# Patient Record
Sex: MALE | Race: WHITE | Employment: FULL TIME | URBAN - METROPOLITAN AREA
[De-identification: names, ages, dates, MRNs, and addresses within clinical notes are randomized per-mention and may not be internally consistent; named-entity substitution may affect disease eponyms.]

---

## 2018-08-13 ENCOUNTER — RADIANT APPOINTMENT (OUTPATIENT)
Dept: MRI IMAGING | Facility: CLINIC | Age: 48
End: 2018-08-13
Attending: PHYSICIAN ASSISTANT

## 2018-08-13 DIAGNOSIS — M54.2 CERVICALGIA: ICD-10-CM

## 2018-08-13 PROCEDURE — 72141 MRI NECK SPINE W/O DYE: CPT | Performed by: RADIOLOGY

## 2021-08-18 ENCOUNTER — OFFICE VISIT (OUTPATIENT)
Dept: URGENT CARE | Facility: URGENT CARE | Age: 51
End: 2021-08-18
Payer: COMMERCIAL

## 2021-08-18 VITALS
OXYGEN SATURATION: 98 % | TEMPERATURE: 98.2 F | RESPIRATION RATE: 18 BRPM | DIASTOLIC BLOOD PRESSURE: 93 MMHG | HEART RATE: 85 BPM | WEIGHT: 235 LBS | SYSTOLIC BLOOD PRESSURE: 138 MMHG

## 2021-08-18 DIAGNOSIS — S60.459A FOREIGN BODY OF FINGER OF RIGHT HAND, INITIAL ENCOUNTER: Primary | ICD-10-CM

## 2021-08-18 PROCEDURE — 99203 OFFICE O/P NEW LOW 30 MIN: CPT | Performed by: PHYSICIAN ASSISTANT

## 2021-08-18 RX ORDER — TRAZODONE HYDROCHLORIDE 50 MG/1
50-100 TABLET, FILM COATED ORAL PRN
COMMUNITY

## 2021-08-18 RX ORDER — ASPIRIN 81 MG/1
81 TABLET, CHEWABLE ORAL DAILY
COMMUNITY

## 2021-08-18 RX ORDER — ALBUTEROL SULFATE 0.83 MG/ML
3 SOLUTION RESPIRATORY (INHALATION) PRN
COMMUNITY

## 2021-08-18 RX ORDER — BACLOFEN 10 MG/1
2 TABLET ORAL DAILY
COMMUNITY
Start: 2021-08-02

## 2021-08-18 RX ORDER — CITALOPRAM HYDROBROMIDE 10 MG/1
10 TABLET ORAL DAILY
COMMUNITY

## 2021-08-18 RX ORDER — BUPRENORPHINE HYDROCHLORIDE 75 UG/1
FILM, SOLUBLE BUCCAL
COMMUNITY
Start: 2021-02-03

## 2021-08-18 RX ORDER — HYDROXYZINE HYDROCHLORIDE 25 MG/1
TABLET, FILM COATED ORAL PRN
COMMUNITY
Start: 2021-03-10

## 2021-08-18 RX ORDER — SENNOSIDES 8.6 MG
1300 CAPSULE ORAL PRN
COMMUNITY

## 2021-08-18 ASSESSMENT — ENCOUNTER SYMPTOMS
SORE THROAT: 0
CHILLS: 0
WOUND: 1
GASTROINTESTINAL NEGATIVE: 1
FREQUENCY: 0
NEUROLOGICAL NEGATIVE: 1
ABDOMINAL PAIN: 0
FEVER: 0
WHEEZING: 0
MUSCULOSKELETAL NEGATIVE: 1
CONSTITUTIONAL NEGATIVE: 1
SHORTNESS OF BREATH: 0
NAUSEA: 0
RESPIRATORY NEGATIVE: 1
MYALGIAS: 0
DIARRHEA: 0
CARDIOVASCULAR NEGATIVE: 1
COUGH: 0
DYSURIA: 0
ALLERGIC/IMMUNOLOGIC NEGATIVE: 1
CHEST TIGHTNESS: 0
HEADACHES: 0
PALPITATIONS: 0
HEMATURIA: 0
VOMITING: 0

## 2021-08-19 NOTE — PROGRESS NOTES
Chief Complaint:    Chief Complaint   Patient presents with     Finger     Splinter in right middle finger, happened on Monday. Hand in painful and swollen.         Medical Decision Making:    Vital signs reviewed by Derrek Saavedra PA-C  BP (!) 138/93 (BP Location: Left arm, Patient Position: Sitting, Cuff Size: Adult Regular)   Pulse 85   Temp 98.2  F (36.8  C) (Tympanic)   Resp 18   Wt 106.6 kg (235 lb)   SpO2 98%     Differential Diagnosis:  Finger infection, finger foreign body.     ASSESSMENT:     1. Foreign body of finger of right hand, initial encounter         PLAN:     Rx for Augmentin tonight.  No foreign body visible or amenable to removal.  Order placed for him to follow up with ortho hand specialist for further evaluation.  Worrisome symptoms discussed with instructions to go to the ED.  Patient verbalized understanding and agreed with this plan.    Labs:     No results found for any visits on 08/18/21.    Current Meds:    Current Outpatient Medications:      amoxicillin-clavulanate (AUGMENTIN) 875-125 MG tablet, Take 1 tablet by mouth 2 times daily for 7 days, Disp: 14 tablet, Rfl: 0     acetaminophen (TYLENOL) 650 MG CR tablet, Take 1,300 mg by mouth as needed, Disp: , Rfl:      albuterol (PROVENTIL) (2.5 MG/3ML) 0.083% neb solution, Inhale 3 mLs into the lungs as needed, Disp: , Rfl:      aspirin (ASA) 81 MG chewable tablet, Take 81 mg by mouth daily, Disp: , Rfl:      baclofen (LIORESAL) 10 MG tablet, Take 2 tablets by mouth daily, Disp: , Rfl:      BELBUCA 75 MCG FILM buccal film, PLACE 1 FILM INSIDE CHEEK 2 TIMES EVERY DAY, HOLDING IN PLACE FOR 5 SECONDS, Disp: , Rfl:      cholecalciferol (VITAMIN D3) 25 mcg (1000 units) capsule, Take 1,000 Units by mouth daily, Disp: , Rfl:      citalopram (CELEXA) 10 MG tablet, Take 10 mg by mouth daily, Disp: , Rfl:      hydrOXYzine (ATARAX) 25 MG tablet, as needed, Disp: , Rfl:      traZODone (DESYREL) 50 MG tablet, Take  mg by mouth as needed,  "Disp: , Rfl:     Allergies:  Allergies   Allergen Reactions     Hydromorphone Difficulty breathing and Nausea and Vomiting     \"Breathing difficulty and profuse vomitting\"  Severe vomiting       Sulfa Drugs Hives and Rash       SUBJECTIVE    HPI: Parth Whitney is an 50 year old male who presents for evaluation and treatment of R middle finger injury.  Patient thinks he got a sliver in the finger 3 days ago.  He has noticed worsening pain, redness and swelling.        Patient is new to [facility].    ROS:      Review of Systems   Constitutional: Negative.  Negative for chills and fever.   HENT: Negative.  Negative for sore throat.    Respiratory: Negative.  Negative for cough, chest tightness, shortness of breath and wheezing.    Cardiovascular: Negative.  Negative for chest pain and palpitations.   Gastrointestinal: Negative.  Negative for abdominal pain, diarrhea, nausea and vomiting.   Genitourinary: Negative for dysuria, frequency, hematuria and urgency.   Musculoskeletal: Negative.  Negative for myalgias.   Skin: Positive for wound. Negative for rash.   Allergic/Immunologic: Negative.  Negative for immunocompromised state.   Neurological: Negative.  Negative for headaches.        Family History   No family history on file.    Social History  Social History     Socioeconomic History     Marital status:      Spouse name: Not on file     Number of children: Not on file     Years of education: Not on file     Highest education level: Not on file   Occupational History     Not on file   Tobacco Use     Smoking status: Never Smoker     Smokeless tobacco: Never Used   Substance and Sexual Activity     Alcohol use: Not on file     Drug use: Not on file     Sexual activity: Not on file   Other Topics Concern     Not on file   Social History Narrative     Not on file     Social Determinants of Health     Financial Resource Strain:      Difficulty of Paying Living Expenses:    Food Insecurity:      " Worried About Running Out of Food in the Last Year:      Ran Out of Food in the Last Year:    Transportation Needs:      Lack of Transportation (Medical):      Lack of Transportation (Non-Medical):    Physical Activity:      Days of Exercise per Week:      Minutes of Exercise per Session:    Stress:      Feeling of Stress :    Social Connections:      Frequency of Communication with Friends and Family:      Frequency of Social Gatherings with Friends and Family:      Attends [employer] Services:      Active Member of Clubs or Organizations:      Attends Club or Organization Meetings:      Marital Status:    Intimate Partner Violence:      Fear of Current or Ex-Partner:      Emotionally Abused:      Physically Abused:      Sexually Abused:         Surgical History:  No past surgical history on file.     Problem List:  Patient Active Problem List   Diagnosis     Pain in joint, shoulder region     Place of occurrence, industrial places and premises     Overexertion and strenuous and repetitive movements or loads           OBJECTIVE:     Vital signs noted and reviewed by Derrek Saavedra PA-C  BP (!) 138/93 (BP Location: Left arm, Patient Position: Sitting, Cuff Size: Adult Regular)   Pulse 85   Temp 98.2  F (36.8  C) (Tympanic)   Resp 18   Wt 106.6 kg (235 lb)   SpO2 98%      PEFR:    Physical Exam  Vitals and nursing note reviewed.   Constitutional:       General: He is not in acute distress.     Appearance: He is well-developed. He is not ill-appearing, toxic-appearing or diaphoretic.   HENT:      Head: Normocephalic and atraumatic.      Right Ear: Hearing, tympanic membrane, ear canal and external ear normal. Tympanic membrane is not perforated, erythematous, retracted or bulging.      Left Ear: Hearing, tympanic membrane, ear canal and external ear normal. Tympanic membrane is not perforated, erythematous, retracted or bulging.      Nose: Nose normal. No mucosal edema, congestion or rhinorrhea.       Mouth/Throat:      Pharynx: No oropharyngeal exudate or posterior oropharyngeal erythema.      Tonsils: No tonsillar exudate or tonsillar abscesses. 0 on the right. 0 on the left.   Eyes:      Pupils: Pupils are equal, round, and reactive to light.   Cardiovascular:      Rate and Rhythm: Normal rate and regular rhythm.      Heart sounds: Normal heart sounds, S1 normal and S2 normal. Heart sounds not distant. No murmur heard.   No friction rub. No gallop.    Pulmonary:      Effort: Pulmonary effort is normal. No respiratory distress.      Breath sounds: Normal breath sounds. No decreased breath sounds, wheezing, rhonchi or rales.   Abdominal:      General: Bowel sounds are normal. There is no distension.      Palpations: Abdomen is soft.      Tenderness: There is no abdominal tenderness.   Musculoskeletal:      Right hand: Swelling and tenderness present. No deformity, lacerations or bony tenderness. Normal range of motion. Normal strength. Normal sensation. There is no disruption of two-point discrimination. Normal capillary refill.      Cervical back: Normal range of motion and neck supple.      Comments: Swelling and erythema of the R middle finger distal phalanx.     Lymphadenopathy:      Cervical: No cervical adenopathy.   Skin:     General: Skin is warm and dry.      Findings: No rash.   Neurological:      Mental Status: He is alert.      Cranial Nerves: No cranial nerve deficit.   Psychiatric:         Attention and Perception: He is attentive.         Speech: Speech normal.         Behavior: Behavior normal. Behavior is cooperative.         Thought Content: Thought content normal.         Judgment: Judgment normal.             Derrek Saavedra PA-C  8/18/2021, 7:56 PM

## 2021-08-19 NOTE — PATIENT INSTRUCTIONS
Patient Education     Foreign Object Under the Skin (Not Removed)  Very small particles that remain under the skin usually don t cause problems or need further treatment. But sometimes they can cause an infection. Sometimes they work their way to the surface on their own without any problems. If you see this happening, you can remove any particles with tweezers. Be careful not to dig up the skin and make things worse.   You may need to see a surgeon if the object is large and couldn't be removed.. The surgeon can assess the injury and treat it. Sometimes a surgeon uses X-rays or ultrasound to guide them in removing the object.   Home care  Wound care    Keep the wound clean and dry.    If there is a dressing or bandage, change it when it gets wet or dirty. Otherwise, leave it on for the first 24 hours, then change it once a day or as often as you were instructed.    If stitches or staples were used, clean the wound every day:  ? After taking off the dressing, wash the area gently with soap and water.  ? Apply a thin layer of antibiotic ointment to the cut. This will keep the wound clean and make it easier to remove the stitches. If it is oozing a lot, you can put a nonstick dressing over it. Then reapply the bandage or dressing as you were instructed.  ? You can get it wet, just like when you clean it. This means you can shower as usual for the first 24 hours. But don't soak the area in water (no baths or swimming) until the stitches or staples are taken out.    If surgical tape or strips were used, keep the area clean and dry. If it becomes wet, blot it dry with a towel.  Medicine    You can take over-the-counter medicine for pain, unless you were given a different pain medicine to use. Talk with your healthcare provider before using these medicines if you have chronic liver or kidney disease, ever had a stomach ulcer or digestive bleeding, or are taking blood-thinner medicines.    If you were given antibiotics,  take them until they are used up. It's important to finish the antibiotics even if the wound looks better to make sure the infection clears.    Follow-up care  Follow up your healthcare provider, or as advised. Keep in mind the following:     Watch for any signs of infection, such as increasing pain, redness, swelling, or pus drainage. If this happens, don t wait for your scheduled visit. See your healthcare provider sooner.    Stitches or staples are usually taken out within 5 to 14 days. This varies depending on what part of your body they are on, and the type of wound. The healthcare provider will tell you how long they should be left in.    If surgical tape or strips were used, they are usually left on for 7 to 10 days. You can remove them after that unless you were told otherwise. If you try to remove them, and it is too difficult, soaking can help. If the edges of the cut pull apart, then stop removing the tape, and follow up with your healthcare provider.    If X-rays were taken, you will be told if there are new findings that may affect your care.  When to seek medical advice  Call your healthcare provider right away if any of these occur:     Fever of 100.4 F (38 C) or higher, or as directed by your healthcare provider    Increasing pain in the wound    Redness, swelling or pus coming from the wound  [email or web address] last reviewed this educational content on 8/1/2019 2000-2021 [employer]. All rights reserved. This information is not intended as a substitute for professional medical care. Always follow your healthcare professional's instructions.

## 2023-05-29 ENCOUNTER — OFFICE VISIT (OUTPATIENT)
Dept: URGENT CARE | Facility: URGENT CARE | Age: 53
End: 2023-05-29
Payer: COMMERCIAL

## 2023-05-29 VITALS
HEART RATE: 76 BPM | OXYGEN SATURATION: 98 % | BODY MASS INDEX: 33.72 KG/M2 | DIASTOLIC BLOOD PRESSURE: 76 MMHG | TEMPERATURE: 98.4 F | HEIGHT: 72 IN | WEIGHT: 249 LBS | SYSTOLIC BLOOD PRESSURE: 108 MMHG

## 2023-05-29 DIAGNOSIS — M23.203 CHRONIC BUCKET HANDLE TEAR OF MEDIAL MENISCUS OF RIGHT KNEE: ICD-10-CM

## 2023-05-29 DIAGNOSIS — M25.561 CHRONIC PAIN OF RIGHT KNEE: Primary | ICD-10-CM

## 2023-05-29 DIAGNOSIS — G89.29 CHRONIC PAIN OF RIGHT KNEE: Primary | ICD-10-CM

## 2023-05-29 PROCEDURE — 20610 DRAIN/INJ JOINT/BURSA W/O US: CPT | Mod: RT | Performed by: FAMILY MEDICINE

## 2023-05-29 PROCEDURE — 99213 OFFICE O/P EST LOW 20 MIN: CPT | Mod: 25 | Performed by: FAMILY MEDICINE

## 2023-05-29 RX ORDER — TRIAMCINOLONE ACETONIDE 40 MG/ML
40 INJECTION, SUSPENSION INTRA-ARTICULAR; INTRAMUSCULAR ONCE
Status: COMPLETED | OUTPATIENT
Start: 2023-05-29 | End: 2023-05-29

## 2023-05-29 RX ADMIN — TRIAMCINOLONE ACETONIDE 40 MG: 40 INJECTION, SUSPENSION INTRA-ARTICULAR; INTRAMUSCULAR at 12:28

## 2023-05-29 NOTE — PROGRESS NOTES
Assessment & Plan     Chronic pain of right knee    - triamcinolone (KENALOG-40) injection 40 mg  - DRAIN/INJECT LARGE JOINT/BURSA    Chronic bucket handle tear of medial meniscus of right knee    - triamcinolone (KENALOG-40) injection 40 mg  - DRAIN/INJECT LARGE JOINT/BURSA    Patient has a chronic right knee pain, he did have a knee CT scan back in June 2022 which showed medial meniscus tear.  Since then he had cortisone injection, lost control last injection was November of last year.  Patient reports that his cortisone injection has been helping.  Pain has been getting worse pain in the last few days, no swelling.       After discussion of various treatment options, and after reviewing risks and benefits and potential complications. And after signing a consent form, we elected to proceed with a cortisone injection for right knee.  The lateral aspect of right knee was cleaned , Betadine and Alcohol, then injected with 40 mg of Kenalog suspension along with 3 cc of 2% Lidocaine.  Patient tolerated injection well, givne post injection instructions.       BMI:   Estimated body mass index is 33.77 kg/m  as calculated from the following:    Height as of this encounter: 1.829 m (6').    Weight as of this encounter: 112.9 kg (249 lb).   Weight management plan: Discussed healthy diet and exercise guidelines    Work on weight loss  Regular exercise    No follow-ups on file.    Norberto Baltazar MD  [facility]RGENT [facility]ARE [facility]    Rose Marie Alba is a 52 year old, presenting for the following health issues:  Urgent Care and Musculoskeletal Problem (Pt in clinic to have eval for right knee pain.)         View : No data to display.              HPI         Review of Systems   Constitutional, HEENT, cardiovascular, pulmonary, gi and gu systems are negative, except as otherwise noted.      Objective    /76   Pulse 76   Temp 98.4  F (36.9  C) (Temporal)   Ht 1.829 m (6')   Wt 112.9 kg (249 lb)    "SpO2 98%   BMI 33.77 kg/m    Body mass index is 33.77 kg/m .  Physical Exam   GENERAL: healthy, alert and no distress  MS: Right knee exam\"  No effusion, no swelling  Tender at medial side of knee  Full range of motion,  Pain with full extension.,  SKIN: no suspicious lesions or rashes  PSYCH: mentation appears normal, affect normal/bright    Orders Placed This Encounter   Procedures     DRAIN/INJECT LARGE JOINT/BURSA         Norberto Baltazar MD            "

## 2023-11-30 ENCOUNTER — ANCILLARY PROCEDURE (OUTPATIENT)
Dept: GENERAL RADIOLOGY | Facility: CLINIC | Age: 53
End: 2023-11-30
Attending: EMERGENCY MEDICINE
Payer: COMMERCIAL

## 2023-11-30 ENCOUNTER — OFFICE VISIT (OUTPATIENT)
Dept: URGENT CARE | Facility: URGENT CARE | Age: 53
End: 2023-11-30
Payer: COMMERCIAL

## 2023-11-30 VITALS
WEIGHT: 234.4 LBS | HEART RATE: 101 BPM | SYSTOLIC BLOOD PRESSURE: 162 MMHG | BODY MASS INDEX: 31.79 KG/M2 | OXYGEN SATURATION: 98 % | DIASTOLIC BLOOD PRESSURE: 85 MMHG | TEMPERATURE: 97.1 F | RESPIRATION RATE: 16 BRPM

## 2023-11-30 DIAGNOSIS — R03.0 ELEVATED BLOOD PRESSURE READING WITHOUT DIAGNOSIS OF HYPERTENSION: ICD-10-CM

## 2023-11-30 DIAGNOSIS — J40 BRONCHITIS: Primary | ICD-10-CM

## 2023-11-30 DIAGNOSIS — J40 BRONCHITIS: ICD-10-CM

## 2023-11-30 PROCEDURE — 99214 OFFICE O/P EST MOD 30 MIN: CPT | Performed by: EMERGENCY MEDICINE

## 2023-11-30 PROCEDURE — 71046 X-RAY EXAM CHEST 2 VIEWS: CPT | Mod: TC | Performed by: RADIOLOGY

## 2023-11-30 RX ORDER — BENZONATATE 100 MG/1
100 CAPSULE ORAL 3 TIMES DAILY PRN
Qty: 25 CAPSULE | Refills: 0 | Status: SHIPPED | OUTPATIENT
Start: 2023-11-30

## 2023-11-30 RX ORDER — PREDNISONE 20 MG/1
40 TABLET ORAL DAILY
Qty: 10 TABLET | Refills: 0 | Status: SHIPPED | OUTPATIENT
Start: 2023-11-30 | End: 2023-12-05

## 2023-11-30 ASSESSMENT — PAIN SCALES - GENERAL: PAINLEVEL: EXTREME PAIN (8)

## 2023-11-30 NOTE — PROGRESS NOTES
Assessment & Plan     Diagnosis:    ICD-10-CM    1. Bronchitis  J40 XR Chest 2 Views     predniSONE (DELTASONE) 20 MG tablet     benzonatate (TESSALON) 100 MG capsule      2. Elevated blood pressure reading without diagnosis of hypertension  R03.0     reports white coat hypertension. Notes his BP at home is always around 120/80. Follow up with PCP        Medical Decision Making:  Parth Whitney is a 53 year old male who presents for evaluation of cough, chest tightness with wheezing, shoulder pain (acute on chronic).  This is consistent with an upper respiratory tract infection.  There is no signs at this point of serious bacterial infection such as OM, RPA, epiglottitis, PTA, strep pharyngitis, pneumonia, meningitis, bacteremia, serious bacterial infection.      Given wheezing/rhonchi on exam, I did a CXR to eval for pneumonia. This shows no acute infiltrate or effusion on my read.  Radiology notes as per below. There are no signs of complications of bronchitis at this point such as septic shock, bacteremia, empyema, hypoxia, respiratory failure or compromise.  Suspect viral bronchitis, medications for symptomatic management as noted above given no contraindications.    There are no gastrointestinal symptoms at this point and no signs of dehydration.  Close followup with PCP is indicated.  Go to ED for fever > 102 F, protracted vomiting, worsening shortness of breath, chest pain or other concerns.  Patient verbalized understanding and agreement with the plan was discharged in stable condition.      Pj Bermudez PA-C  [facility] URGENT CARE    Subjective     Parth Whitney is a 53 year old male who presents to clinic today for the following health issues:  Chief Complaint   Patient presents with    Cough     Deep cough. Two negative covid test. Sx onset- Saturday     Wheezing    Generalized Body Aches       HPI  Patient reports 5 days of cough, body aches, wheezing, feeling slightly short of  breath with this. He notes he is having a lot of back and shoulder pain whenever he coughs as he has broken vertebrae in his back from an accident, has a fentanyl patch in place.  States his chest does feel like it is burning, but is not having any exertional chest pain he does note that he has been dealing with shortness of breath issues since he had COVID 2 years ago; believes he has long hauler COVID symptoms.  He denies any shortness of breath at rest or with minimal exertion, leg swelling, history of blood clots in the legs or lungs, sore throat, difficulty swallowing or other concerns    Review of Systems    See HPI    Objective      Vitals: BP (!) 162/85 (BP Location: Left arm, Patient Position: Sitting, Cuff Size: Adult Regular)   Pulse 101   Temp 97.1  F (36.2  C) (Tympanic)   Resp 16   Wt 106.3 kg (234 lb 6.4 oz)   SpO2 98%   BMI 31.79 kg/m    Resp: 16    Patient Vitals for the past 24 hrs:   BP Temp Temp src Pulse Resp SpO2 Weight   11/30/23 1416 (!) 162/85 -- -- -- -- -- --   11/30/23 1415 (!) 156/104 97.1  F (36.2  C) Tympanic 101 16 98 % 106.3 kg (234 lb 6.4 oz)       Vital signs reviewed by: Pj Bermudez PA-C    Physical Exam   Constitutional: Patient is alert and cooperative. No acute distress.  Ears: Right TM is normal. Left TM is normal. External ear canals are normal.  Mouth: Mucous membranes are moist. Normal tongue and tonsil. Posterior oropharynx is clear.  Eyes: Conjunctivae, EOMI and lids are normal. PERRL.  Cardiovascular: Regular rate and rhythm  Pulmonary/Chest: Wheezes/rhonchi in the right lung fields.  No crackles.  Effort normal.  No retractions.  Speaking in full sentences, no respiratory distress.  Neurological: Alert and oriented x3.  MSK: No leg swelling bilaterally.   Skin: No rash noted on visualized skin.  Psychiatric:The patient has a normal mood and affect.     Labs/Imaging:  Results for orders placed or performed in visit on 11/30/23   XR Chest 2 Views     Status:  None    Narrative    CHEST TWO VIEWS  11/30/2023 2:46 PM     HISTORY: Bronchitis.    COMPARISON: None.       Impression    IMPRESSION:  There are no acute infiltrates. The cardiac silhouette is  not enlarged. Pulmonary vasculature is unremarkable.     MILLIE JASMINE MD         SYSTEM ID:  [id number]     Reading per radiology        Pj Bermudez PA-C, November 30, 2023

## 2024-05-13 ENCOUNTER — OFFICE VISIT (OUTPATIENT)
Dept: URGENT CARE | Facility: URGENT CARE | Age: 54
End: 2024-05-13
Payer: MEDICARE

## 2024-05-13 VITALS
HEART RATE: 111 BPM | TEMPERATURE: 98.2 F | DIASTOLIC BLOOD PRESSURE: 104 MMHG | SYSTOLIC BLOOD PRESSURE: 177 MMHG | OXYGEN SATURATION: 96 % | WEIGHT: 250 LBS | BODY MASS INDEX: 33.91 KG/M2

## 2024-05-13 DIAGNOSIS — S71.112A LACERATION OF LEFT THIGH, INITIAL ENCOUNTER: Primary | ICD-10-CM

## 2024-05-13 PROCEDURE — 12034 INTMD RPR S/TR/EXT 7.6-12.5: CPT | Performed by: EMERGENCY MEDICINE

## 2024-05-13 RX ORDER — CEPHALEXIN 500 MG/1
500 CAPSULE ORAL 3 TIMES DAILY
Qty: 15 CAPSULE | Refills: 0 | Status: SHIPPED | OUTPATIENT
Start: 2024-05-13 | End: 2024-05-18

## 2024-05-13 NOTE — PROGRESS NOTES
"Assessment & Plan     Diagnosis:    ICD-10-CM    1. Laceration of left thigh, initial encounter  S71.112A cephALEXin (KEFLEX) 500 MG capsule     REPAIR INTERMED, WOUND TRUNK/ARM/LEG 2.6-7.5 CM          Medical Decision Making:  The patient presented with a laceration.  The wound was carefully evaluated and explored.  The laceration was closed with sutures as noted below.  There is no evidence of muscular, tendon, or bony damage with this laceration.  No signs of foreign bodies after debridement and thorough cleansing.  Possible complications (infection, scarring) were reviewed with the patient.  Given this was a dirty chainsaw that struck his thigh I will place him on antibiotics prophylactically.  Follow up with primary care will be indicated for suture removal as noted in the discharge section.    45 minutes spent by me on the date of the encounter doing chart review, patient visit, documentation, and procedure (~35 minutes).        Pj Bermudez PA-C  [facility] URGENT CARE    Subjective     Parth Whitney is a 53 year old male who presents to clinic today for the following health issues:  Chief Complaint   Patient presents with    Urgent Care    Laceration     Cut above the knee-left       HPI  Patient states that about 1 hour ago he accidentally got kicked back from a chainsaw which struck his left thigh, immediately put pressure on this.  Is unsure how deep it was but does not think it went too deep.  He states that the bleeding was well-controlled and is \"not squirting from his leg.\"  He notes he is able to walk, is having no numbness or weakness in the leg or color changes to it.  Denies any other injuries.  Tetanus status is up-to-date.    Review of Systems    See HPI    Objective      Vitals: BP (!) 177/104 (BP Location: Left arm, Patient Position: Sitting, Cuff Size: Adult Regular)   Pulse 111   Temp 98.2  F (36.8  C) (Tympanic)   Wt 113.4 kg (250 lb)   SpO2 96%   BMI 33.91 kg/m  "       Patient Vitals for the past 24 hrs:   BP Temp Temp src Pulse SpO2 Weight   05/13/24 1729 (!) 177/104 98.2  F (36.8  C) Tympanic 111 96 % 113.4 kg (250 lb)       Vital signs reviewed by: Pj Bermudez PA-C    Physical Exam   Constitutional: Patient is alert and cooperative. No acute distress.  Neurological: Alert and oriented x3. Strength and sensation intact in the left lower extremity.  MSK/Skin: ~9cm linear slightly gaping laceration with 2 macerated wound edges proximally. No active bleeding. Fatty tissue present; no visualized tendon/bone. Normal ROM at the hip and knee.   Psychiatric:The patient has a normal mood and affect.       Laceration Repair        LACERATION:  A minimally Contaminated ~9cm laceration.      LOCATION:  left anterior thigh      FUNCTION:  Distally sensation, circulation, motor, and tendon function are intact.      ANESTHESIA:  Local using 1% Lidocaine with epi; total of 8 mLs      PREPARATION:  Irrigation and Scrubbing with Normal Saline and Betadine      DEBRIDEMENT:  minimal debridement    CLOSURE:  Wound was closed with Two Layers: Subcutaneous layer closed with 2 x 3.0 Vicryl Sutures. Skin closed with 11 x 3.0 Ethylon using interrupted sutures      Pj Bermudez PA-C, May 13, 2024